# Patient Record
Sex: MALE | Race: WHITE | NOT HISPANIC OR LATINO
[De-identification: names, ages, dates, MRNs, and addresses within clinical notes are randomized per-mention and may not be internally consistent; named-entity substitution may affect disease eponyms.]

---

## 2022-02-17 ENCOUNTER — APPOINTMENT (OUTPATIENT)
Dept: VASCULAR SURGERY | Facility: CLINIC | Age: 57
End: 2022-02-17

## 2022-08-12 PROBLEM — Z00.00 ENCOUNTER FOR PREVENTIVE HEALTH EXAMINATION: Status: ACTIVE | Noted: 2022-08-12

## 2022-08-27 ENCOUNTER — NON-APPOINTMENT (OUTPATIENT)
Age: 57
End: 2022-08-27

## 2022-09-01 ENCOUNTER — APPOINTMENT (OUTPATIENT)
Dept: VASCULAR SURGERY | Facility: CLINIC | Age: 57
End: 2022-09-01

## 2022-09-01 VITALS
TEMPERATURE: 98 F | HEART RATE: 109 BPM | HEIGHT: 75.5 IN | SYSTOLIC BLOOD PRESSURE: 164 MMHG | BODY MASS INDEX: 31.38 KG/M2 | WEIGHT: 255 LBS | DIASTOLIC BLOOD PRESSURE: 99 MMHG

## 2022-09-01 PROCEDURE — 93970 EXTREMITY STUDY: CPT

## 2022-09-01 PROCEDURE — 99203 OFFICE O/P NEW LOW 30 MIN: CPT

## 2022-09-01 NOTE — ASSESSMENT
[FreeTextEntry1] : 57-year-old male with a past medical history of prediabetes hypertension presents for follow-up of recurrent lower extremity varicose veins with a numbing type of pain of the left foot in addition to recurrent varicose veins\par \par Venous duplex shows reflux throughout the right small saphenous vein and greater saphenous vein from the distal thigh to the proximal calf she is going to partial chronic thrombus seen throughout the small saphenous vein and small Baker's cyst on the left lower extremity was noted to have ablated greater saphenous vein throughout the thigh with reflux of the greater saphenous vein at the distal thigh to proximal calf and small saphenous vein with mild thickening.  At this time would recommend compression stockings elevation and exercise/weight loss\par \par Patient to follow-up as needed

## 2022-09-01 NOTE — PHYSICAL EXAM
[2+] : left 2+ [Ankle Swelling (On Exam)] : present [Ankle Swelling On The Left] : of the left ankle [Varicose Veins Of Lower Extremities] : bilaterally [] : of the left leg [Ankle Swelling On The Right] : mild [No Rash or Lesion] : No rash or lesion [Alert] : alert [Calm] : calm [JVD] : no jugular venous distention  [Skin Ulcer] : no ulcer [de-identified] : Appears well [de-identified] : No calf tenderness

## 2022-09-01 NOTE — HISTORY OF PRESENT ILLNESS
[FreeTextEntry1] : 57-year-old male with a past medical history of prediabetes hypertension presents for follow-up of recurrent lower extremity varicose veins with a numbing type of pain especially in the left lower extremity when standing with flat soled shoes.  Patient reports when wearing new balance shoes with an appropriate arch the pain is relieved.  Patient denies any history of DVT ulcers or wounds.  Patient denies any history of claudications.  Patient states that had left lower extremity vein surgery many years ago.  Patient has some skin discoloration over the dorsum of the left foot possibly secondary to venous insufficiency

## 2022-12-02 ENCOUNTER — NON-APPOINTMENT (OUTPATIENT)
Age: 57
End: 2022-12-02

## 2023-07-06 ENCOUNTER — NON-APPOINTMENT (OUTPATIENT)
Age: 58
End: 2023-07-06

## 2023-10-10 ENCOUNTER — APPOINTMENT (OUTPATIENT)
Dept: VASCULAR SURGERY | Facility: CLINIC | Age: 58
End: 2023-10-10

## 2024-01-04 ENCOUNTER — APPOINTMENT (OUTPATIENT)
Dept: VASCULAR SURGERY | Facility: CLINIC | Age: 59
End: 2024-01-04
Payer: COMMERCIAL

## 2024-01-04 DIAGNOSIS — I87.2 VENOUS INSUFFICIENCY (CHRONIC) (PERIPHERAL): ICD-10-CM

## 2024-01-04 PROCEDURE — 99204 OFFICE O/P NEW MOD 45 MIN: CPT

## 2024-01-05 NOTE — ASSESSMENT
[Arterial/Venous Disease] : arterial/venous disease [FreeTextEntry1] : 58 year old male with history of venous insufficiency s/p left lower extremity ablation   Patient with no evidence of significant arterial insufficiency at this time.  Recommend compression and elevation.  Patient to return to office for left venous duplex.

## 2024-01-05 NOTE — HISTORY OF PRESENT ILLNESS
[FreeTextEntry1] : 58-year-old male with a past medical history of prediabetes, hypertension, and venous insufficiency s/p left greater saphenous ablation who presents to the office today for follow-up. Patient reports persistent bruising/ discoloration of the dorsal aspect of the left foot for the past six months. Denies rest pain or claudication symptoms. Denies tissue loss, ulceration or bleeding varicosities.

## 2024-01-05 NOTE — PHYSICAL EXAM
[Normal Breath Sounds] : Normal breath sounds [Normal Rate and Rhythm] : normal rate and rhythm [2+] : left 2+ [Ankle Swelling On The Left] : of the left ankle [Varicose Veins Of Lower Extremities] : bilaterally [] : of the left leg [Ankle Swelling On The Right] : mild [No Rash or Lesion] : No rash or lesion [Alert] : alert [Calm] : calm [JVD] : no jugular venous distention  [Ankle Swelling (On Exam)] : not present [Skin Ulcer] : no ulcer [de-identified] : Appears well [de-identified] : No calf tenderness.

## 2024-01-10 ENCOUNTER — APPOINTMENT (OUTPATIENT)
Dept: VASCULAR SURGERY | Facility: CLINIC | Age: 59
End: 2024-01-10

## 2024-01-25 ENCOUNTER — APPOINTMENT (OUTPATIENT)
Dept: VASCULAR SURGERY | Facility: CLINIC | Age: 59
End: 2024-01-25
Payer: COMMERCIAL

## 2024-01-25 PROCEDURE — 93971 EXTREMITY STUDY: CPT | Mod: LT

## 2024-01-25 PROCEDURE — 99214 OFFICE O/P EST MOD 30 MIN: CPT
